# Patient Record
Sex: MALE | Race: WHITE | NOT HISPANIC OR LATINO | Employment: OTHER | URBAN - METROPOLITAN AREA
[De-identification: names, ages, dates, MRNs, and addresses within clinical notes are randomized per-mention and may not be internally consistent; named-entity substitution may affect disease eponyms.]

---

## 2017-05-16 ENCOUNTER — OFFICE VISIT (OUTPATIENT)
Dept: FAMILY MEDICINE | Facility: CLINIC | Age: 42
End: 2017-05-16
Payer: COMMERCIAL

## 2017-05-16 ENCOUNTER — DOCUMENTATION ONLY (OUTPATIENT)
Dept: FAMILY MEDICINE | Facility: CLINIC | Age: 42
End: 2017-05-16

## 2017-05-16 VITALS
OXYGEN SATURATION: 99 % | HEART RATE: 82 BPM | RESPIRATION RATE: 16 BRPM | SYSTOLIC BLOOD PRESSURE: 119 MMHG | DIASTOLIC BLOOD PRESSURE: 84 MMHG | BODY MASS INDEX: 20.04 KG/M2 | TEMPERATURE: 98 F | WEIGHT: 147.94 LBS | HEIGHT: 72 IN

## 2017-05-16 DIAGNOSIS — R07.89 OTHER CHEST PAIN: Primary | ICD-10-CM

## 2017-05-16 DIAGNOSIS — G43.909 MIGRAINE WITHOUT STATUS MIGRAINOSUS, NOT INTRACTABLE, UNSPECIFIED MIGRAINE TYPE: ICD-10-CM

## 2017-05-16 PROCEDURE — 99214 OFFICE O/P EST MOD 30 MIN: CPT | Mod: S$GLB,,, | Performed by: INTERNAL MEDICINE

## 2017-05-16 PROCEDURE — 1160F RVW MEDS BY RX/DR IN RCRD: CPT | Mod: S$GLB,,, | Performed by: INTERNAL MEDICINE

## 2017-05-16 RX ORDER — IBUPROFEN 200 MG
TABLET ORAL
Refills: 0 | COMMUNITY
Start: 2017-04-27 | End: 2017-08-30

## 2017-05-16 RX ORDER — PROPRANOLOL HYDROCHLORIDE 20 MG/1
20 TABLET ORAL 3 TIMES DAILY
Qty: 90 TABLET | Refills: 11 | Status: SHIPPED | OUTPATIENT
Start: 2017-05-16 | End: 2017-06-28

## 2017-05-16 RX ORDER — SUMATRIPTAN SUCCINATE 100 MG/1
100 TABLET ORAL ONCE
Qty: 6 TABLET | Refills: 11 | Status: SHIPPED | OUTPATIENT
Start: 2017-05-16 | End: 2019-07-11

## 2017-05-16 RX ORDER — OMEPRAZOLE 20 MG/1
20 CAPSULE, DELAYED RELEASE ORAL DAILY
Qty: 30 CAPSULE | Refills: 11 | Status: SHIPPED | OUTPATIENT
Start: 2017-05-16 | End: 2018-06-13 | Stop reason: SDUPTHER

## 2017-05-16 RX ORDER — FAMOTIDINE 20 MG/1
1 TABLET, FILM COATED ORAL DAILY PRN
Refills: 0 | COMMUNITY
Start: 2017-04-27 | End: 2017-09-25

## 2017-05-16 NOTE — PROGRESS NOTES
Subjective:       Patient ID: Jabier Doss is a 41 y.o. male.    Chief Complaint: Hospital Follow Up (chest pain) and Sinus Problem    HPI       CHIEF COMPLAINT: CHEST PAIN    HPI:     ONSET/TIMING:   3 wks  ago    DURATION:  4 hours  QUALITY/COURSE:  Resolved.  Went to the ER.    SEVERITY: #   8 /10 ( on 1 to 10 scale)    PREVIOUS CARDIAC TESTING: : exercise stress test [facility]. Was neg     LOCATION:  substernal   Radiation -  none    All choices for next 4 sections below are positive to the patient ONLY if BOLDED, otherwise negative:    AGGRAVATING FACTORS: Swallowing, , Deep_Breathing, Coughing, Neck/Arm/Chest_Movement     RELIEVING FACTORS: Nitroglycerin, Resting, Change_of_Position    ASSOCIATED SYMPTOMS:  Hemoptysis,Tenderness,  Leg_Pain    RISK FACTORS: Diabetes, HTN, Hyperlipidemia, smoking,       CHIEF COMPLAINT: Headache  HPI: Sometimes gets tearing of the left eye  presently has headache: no..    Severity is # 8  (10 point scale).  has a headache    20       days per month.  ONSET:      5 y  ago.    QUALITY/COURSE:    unchanged  DURATION: One half hour to 24 hours   The following symptoms/statements  are positive if BOLD, negative otherwise.     CHARACTERISTICS: sudden. atypical.   Awakening_the_patient_from_sleep. Worse_on_awakening.. .  Pulsating .     Squeezing.  Stabbing.  LOCATION:  . Right.  Left:. . Forehead. Face. Jaw. Eyes. Frontal. Temporal. Top/Vertex. Posterior. Radiation:   AGGRAVATING FACTORS: head trauma . FH of headache.   anxiety .  pressure points of the face . placing the head lower than the trunk .  PAST TREATMENT OR EVALUATION: medications:   CT scan .  MRI.  ASSOCIATED SYMPTOMS:  N/V .  Changes_in_memory .  Change_in_mentation .  Motor_changes . Sensation_changes.  . Stiff_neck . fever. phonophobia .  . prodrome.  Medical History: Any_neurological_disease . migraines . Emotional_problems . Sinus_disease .                      Review of Systems   Constitutional: Negative for  activity change and unexpected weight change.   HENT: Negative for hearing loss, rhinorrhea and trouble swallowing.    Eyes: Positive for photophobia. Negative for discharge and visual disturbance.   Respiratory: Positive for chest tightness. Negative for wheezing.    Cardiovascular: Positive for chest pain and palpitations.   Gastrointestinal: Negative for blood in stool, constipation, diarrhea and vomiting.   Endocrine: Negative for polydipsia and polyuria.   Genitourinary: Negative for difficulty urinating, hematuria and urgency.   Musculoskeletal: Negative for arthralgias and joint swelling.   Neurological: Positive for headaches. Negative for weakness.   Psychiatric/Behavioral: Negative for confusion and dysphoric mood.       Objective:      Vitals:    05/16/17 1606   BP: 119/84   Pulse: 82   Resp: 16   Temp: 98.2 °F (36.8 °C)   TempSrc: Oral   SpO2: 99%   Weight: 67.1 kg (147 lb 14.9 oz)   Height: 6' (1.829 m)   PainSc: 0-No pain     Physical Exam   Constitutional: He appears well-developed and well-nourished.   Eyes: Pupils are equal, round, and reactive to light.   Cardiovascular: Normal rate, regular rhythm and normal heart sounds.    Pulmonary/Chest: Effort normal and breath sounds normal.   Abdominal: Soft. There is no tenderness.   Neurological: He is alert.   Psychiatric: He has a normal mood and affect. His behavior is normal. Thought content normal.   Nursing note and vitals reviewed.        Assessment:       1. Other chest pain    2. Migraine without status migrainosus, not intractable, unspecified migraine type        some attributes of cluster headache probably migraine  Plan:     Other chest pain  -     omeprazole (PRILOSEC) 20 MG capsule; Take 1 capsule (20 mg total) by mouth once daily.  Dispense: 30 capsule; Refill: 11    Migraine without status migrainosus, not intractable, unspecified migraine type  -     propranolol (INDERAL) 20 MG tablet; Take 1 tablet (20 mg total) by mouth 3 (three) times  daily.  Dispense: 90 tablet; Refill: 11  -     sumatriptan (IMITREX) 100 MG tablet; Take 1 tablet (100 mg total) by mouth once. No more than twice in a 24 hour period  Dispense: 6 tablet; Refill: 11      Return in about 6 weeks (around 6/27/2017).

## 2017-05-16 NOTE — PATIENT INSTRUCTIONS
When you get the sinus congestion take 2 Kim-[place]'s or the Imitrex.  Don't let it wait until you get the headache.    If not drinking the caffeine provokes the headache he may be getting caffeine withdrawal headaches.  Let us know and we can review some medications to help you stay off of it for 2 weeks after which she'll have fewer headaches.

## 2017-05-16 NOTE — PROGRESS NOTES
Health Maintenance Due   Topic Date Due    TETANUS VACCINE  11/03/1992    Pneumococcal PPSV23 (Medium Risk) (1) 11/03/1992

## 2017-05-16 NOTE — MR AVS SNAPSHOT
[facility]  [address] [address] [address]  Phone: [phone]  Fax: [phone]                  Jabier Doss   2017 4:00 PM   Office Visit    Description:  Male : 1975   Provider:  Jefferson Franco MD   Department:  [facility]           Reason for Visit     Hospital Follow Up     Sinus Problem           Diagnoses this Visit        Comments    Other chest pain    -  Primary     Migraine without status migrainosus, not intractable, unspecified migraine type                To Do List           Future Appointments        Provider Department Dept Phone    2017 4:20 PM Jefferson Franco MD [facility] [phone]      Goals (5 Years of Data)     None      Follow-Up and Disposition     Return in about 6 weeks (around 2017).    Follow-up and Disposition History       These Medications        Disp Refills Start End    omeprazole (PRILOSEC) 20 MG capsule 30 capsule 11 2017     Take 1 capsule (20 mg total) by mouth once daily. - Oral    Pharmacy: [address] - [address] [facility] [address] Ph #: [phone]       propranolol (INDERAL) 20 MG tablet 90 tablet 11 2017    Take 1 tablet (20 mg total) by mouth 3 (three) times daily. - Oral    Pharmacy: [address] - [address] [address] Ph #: [phone]       sumatriptan (IMITREX) 100 MG tablet 6 tablet 11 2017    Take 1 tablet (100 mg total) by mouth once. No more than twice in a 24 hour period - Oral    Pharmacy: [address] - [address] [address] Ph #: [phone]         [facility] On Call     [facility] -  Assistance  Unless otherwise directed by your provider, please contact [facility], our nurse care line that is available for  assistance.     Registered nurses in the [facility] provide: appointment scheduling, clinical advisement, health  education, and other advisory services.  Call: [phone] (toll free)               Medications           Message regarding Medications     Verify the changes and/or additions to your medication regime listed below are the same as discussed with your clinician today.  If any of these changes or additions are incorrect, please notify your healthcare provider.        START taking these NEW medications        Refills    omeprazole (PRILOSEC) 20 MG capsule 11    Sig: Take 1 capsule (20 mg total) by mouth once daily.    Class: Normal    Route: Oral    propranolol (INDERAL) 20 MG tablet 11    Sig: Take 1 tablet (20 mg total) by mouth 3 (three) times daily.    Class: Normal    Route: Oral    sumatriptan (IMITREX) 100 MG tablet 11    Sig: Take 1 tablet (100 mg total) by mouth once. No more than twice in a 24 hour period    Class: Normal    Route: Oral           Verify that the below list of medications is an accurate representation of the medications you are currently taking.  If none reported, the list may be blank. If incorrect, please contact your healthcare provider. Carry this list with you in case of emergency.           Current Medications     cetirizine (ZYRTEC) 10 MG tablet Take 1 tablet (10 mg total) by mouth once daily.    famotidine (PEPCID) 20 MG tablet Take 1 tablet by mouth daily as needed.    nicotine (NICODERM CQ) 21 mg/24 hr     tamsulosin (FLOMAX) 0.4 mg Cp24 Take 1 capsule (0.4 mg total) by mouth once daily.    omeprazole (PRILOSEC) 20 MG capsule Take 1 capsule (20 mg total) by mouth once daily.    propranolol (INDERAL) 20 MG tablet Take 1 tablet (20 mg total) by mouth 3 (three) times daily.    sumatriptan (IMITREX) 100 MG tablet Take 1 tablet (100 mg total) by mouth once. No more than twice in a 24 hour period           Clinical Reference Information           Your Vitals Were     BP Pulse Temp Resp Height Weight    119/84 (BP Location: Left arm, Patient Position: Sitting, BP Method: Automatic) 82  98.2 °F (36.8 °C) (Oral) 16 6' (1.829 m) 67.1 kg (147 lb 14.9 oz)    SpO2 BMI             99% 20.06 kg/m2         Blood Pressure          Most Recent Value    BP  119/84      Allergies as of 5/16/2017     No Known Allergies      Immunizations Administered on Date of Encounter - 5/16/2017     None      Instructions    When you get the sinus congestion take 2 Kim-[place]'s or the Imitrex.  Don't let it wait until you get the headache.    If not drinking the caffeine provokes the headache he may be getting caffeine withdrawal headaches.  Let us know and we can review some medications to help you stay off of it for 2 weeks after which she'll have fewer headaches.       Language Assistance Services     ATTENTION: Language assistance services are available, free of charge. Please call [phone].      ATENCIÓN: Si jennifer hargrove, tiene a ordonez disposición servicios gratuitos de asistencia lingüística. Llame al [phone].     CHÚ Ý: N?u b?n nói Ti?ng Vi?t, có các d?ch v? h? tr? ngôn ng? mi?n phí dành cho b?n. G?i s? [phone].         [facility] complies with applicable Federal civil rights laws and does not discriminate on the basis of race, color, national origin, age, disability, or sex.

## 2017-06-28 ENCOUNTER — OFFICE VISIT (OUTPATIENT)
Dept: FAMILY MEDICINE | Facility: CLINIC | Age: 42
End: 2017-06-28
Payer: COMMERCIAL

## 2017-06-28 ENCOUNTER — DOCUMENTATION ONLY (OUTPATIENT)
Dept: FAMILY MEDICINE | Facility: CLINIC | Age: 42
End: 2017-06-28

## 2017-06-28 VITALS
TEMPERATURE: 98 F | DIASTOLIC BLOOD PRESSURE: 82 MMHG | HEIGHT: 72 IN | WEIGHT: 147.5 LBS | RESPIRATION RATE: 16 BRPM | HEART RATE: 63 BPM | SYSTOLIC BLOOD PRESSURE: 126 MMHG | OXYGEN SATURATION: 97 % | BODY MASS INDEX: 19.98 KG/M2

## 2017-06-28 DIAGNOSIS — R07.89 ATYPICAL CHEST PAIN: Primary | ICD-10-CM

## 2017-06-28 DIAGNOSIS — G43.009 MIGRAINE WITHOUT AURA AND WITHOUT STATUS MIGRAINOSUS, NOT INTRACTABLE: ICD-10-CM

## 2017-06-28 PROCEDURE — 99214 OFFICE O/P EST MOD 30 MIN: CPT | Mod: S$GLB,,, | Performed by: INTERNAL MEDICINE

## 2017-06-28 RX ORDER — NITROGLYCERIN 0.4 MG/1
0.4 TABLET SUBLINGUAL EVERY 5 MIN PRN
Qty: 20 TABLET | Refills: 1 | Status: SHIPPED | OUTPATIENT
Start: 2017-06-28 | End: 2019-07-11

## 2017-06-28 RX ORDER — METOCLOPRAMIDE 5 MG/1
TABLET ORAL
Qty: 20 TABLET | Refills: 1 | Status: SHIPPED | OUTPATIENT
Start: 2017-06-28 | End: 2017-09-25

## 2017-06-28 RX ORDER — NAPROXEN SODIUM 220 MG/1
81 TABLET, FILM COATED ORAL DAILY
Start: 2017-06-28 | End: 2019-07-11

## 2017-06-28 NOTE — PATIENT INSTRUCTIONS
Stop propanolol    If you get a chest pain last more than 5 minutes taken nitroglycerin and some Mylanta.  If it lasts more than 20 minutes call 911    Prilosec needs to be taken half hour before you eat food.

## 2017-06-28 NOTE — PROGRESS NOTES
Subjective:       Patient ID: Jabier Doss is a 41 y.o. male.    Chief Complaint: Headache (discuss medication )    HPI   CHIEF COMPLAINT: Headache  HPI: Taking Kim-[place]', s working well.  He cut out the energy drinks is not getting some any migraines.  He hasn't had to take any Imitrex.  The patient is not tolerating the propranolol.  Makes him irritable and constipated  presently has headache: no..    Severity is # 3   (10 point scale).  has a headache         4  days per month.  ONSET:   Years     ago.    QUALITY/COURSE:    Markedly improved  DURATION:     The following symptoms/statements  are positive if BOLD, negative otherwise.     CHARACTERISTICS: sudden. atypical.   Awakening_the_patient_from_sleep. Worse_on_awakening.. .  Pulsating .     Squeezing.  Stabbing.  LOCATION:  . Right.  Left:. . Forehead. Face. Jaw. Eyes. Frontal. Temporal. Top/Vertex. Posterior. Radiation:   AGGRAVATING FACTORS: head trauma . FH of headache.   anxiety .  pressure points of the face . placing the head lower than the trunk .  PAST TREATMENT OR EVALUATION: medications:   CT scan .  MRI.  ASSOCIATED SYMPTOMS:  N/V .  Changes_in_memory .  Change_in_mentation .  Motor_changes . Sensation_changes.  . Stiff_neck . fever. phonophobia .  . prodrome.  Medical History: Any_neurological_disease . migraines . Emotional_problems . Sinus_disease .            CHIEF COMPLAINT: CHEST PAIN    HPI: Usually happens when he is at rest never when he's working.  The patient states that he's taking the Prilosec on an empty stomach in the morning and never eating breakfast.    ONSET/TIMIN months   ago    DURATION:  Anywhere from seconds to a half hour.  QUALITY/COURSE:  unchanged.    SEVERITY: #    6  /10 ( on 1 to 10 scale)    PREVIOUS CARDIAC TESTING: : exercise stress test Stress done on 17 at [facility] and was negative.    LOCATION:  Left chest near where he had a pneumothorax and a chest tube   Radiation -  none    All choices for next  4 sections below are positive to the patient ONLY if BOLDED, otherwise negative:    AGGRAVATING FACTORS: Swallowing, , Deep_Breathing, Coughing, Neck/Arm/Chest_Movement     RELIEVING FACTORS: Nitroglycerin, Resting, Change_of_Position    ASSOCIATED SYMPTOMS:  Hemoptysis,Tenderness,  Leg_Pain    RISK FACTORS: Diabetes, HTN, Hyperlipidemia, smoking,                     Review of Systems   Constitutional: Negative for activity change and unexpected weight change.   HENT: Negative for hearing loss, rhinorrhea and trouble swallowing.    Eyes: Negative for discharge and visual disturbance.   Respiratory: Positive for chest tightness. Negative for wheezing.    Cardiovascular: Positive for chest pain. Negative for palpitations.   Gastrointestinal: Positive for constipation. Negative for blood in stool, diarrhea and vomiting.   Endocrine: Negative for polydipsia and polyuria.   Genitourinary: Negative for difficulty urinating, hematuria and urgency.   Musculoskeletal: Negative for arthralgias, joint swelling and neck pain.   Neurological: Positive for weakness and headaches.   Psychiatric/Behavioral: Positive for confusion. Negative for dysphoric mood.       Objective:      Vitals:    06/28/17 1633   BP: 126/82   Pulse: 63   Resp: 16   Temp: 98 °F (36.7 °C)   TempSrc: Oral   SpO2: 97%   Weight: 66.9 kg (147 lb 7.8 oz)   Height: 6' (1.829 m)   PainSc:   2   PainLoc: Abdomen     Physical Exam   Constitutional: He appears well-developed and well-nourished.   Eyes: Pupils are equal, round, and reactive to light.   Cardiovascular: Normal rate, regular rhythm and normal heart sounds.    Pulmonary/Chest: Effort normal and breath sounds normal. He exhibits no tenderness.   Abdominal: Soft. There is no tenderness.   Neurological: He is alert.   Psychiatric: He has a normal mood and affect. His behavior is normal. Thought content normal.   Nursing note and vitals reviewed.        Assessment:       1. Atypical chest pain    2. Migraine  without aura and without status migrainosus, not intractable          Plan:     Atypical chest pain  -     nitroGLYCERIN (NITROSTAT) 0.4 MG SL tablet; Place 1 tablet (0.4 mg total) under the tongue every 5 (five) minutes as needed for Chest pain.  Dispense: 20 tablet; Refill: 1  -     aspirin 81 MG Chew; Take 1 tablet (81 mg total) by mouth once daily.    Migraine without aura and without status migrainosus, not intractable  -     metoclopramide HCl (REGLAN) 5 MG tablet; Take with 2 Kim-[place]'s at the start of a headache  Dispense: 20 tablet; Refill: 1      Return in about 3 months (around 9/28/2017).

## 2017-07-05 DIAGNOSIS — R35.1 NOCTURIA: ICD-10-CM

## 2017-07-05 RX ORDER — TAMSULOSIN HYDROCHLORIDE 0.4 MG/1
CAPSULE ORAL
Qty: 30 CAPSULE | Refills: 11 | Status: SHIPPED | OUTPATIENT
Start: 2017-07-05 | End: 2017-09-25

## 2017-08-30 ENCOUNTER — OFFICE VISIT (OUTPATIENT)
Dept: FAMILY MEDICINE | Facility: CLINIC | Age: 42
End: 2017-08-30
Payer: COMMERCIAL

## 2017-08-30 ENCOUNTER — DOCUMENTATION ONLY (OUTPATIENT)
Dept: FAMILY MEDICINE | Facility: CLINIC | Age: 42
End: 2017-08-30

## 2017-08-30 VITALS
OXYGEN SATURATION: 100 % | HEART RATE: 85 BPM | HEIGHT: 72 IN | TEMPERATURE: 99 F | DIASTOLIC BLOOD PRESSURE: 91 MMHG | WEIGHT: 146.38 LBS | SYSTOLIC BLOOD PRESSURE: 121 MMHG | BODY MASS INDEX: 19.83 KG/M2

## 2017-08-30 DIAGNOSIS — L24.9 IRRITANT CONTACT DERMATITIS, UNSPECIFIED TRIGGER: ICD-10-CM

## 2017-08-30 DIAGNOSIS — B86 SCABIES: Primary | ICD-10-CM

## 2017-08-30 PROBLEM — G43.009 MIGRAINE WITHOUT AURA AND WITHOUT STATUS MIGRAINOSUS, NOT INTRACTABLE: Status: ACTIVE | Noted: 2017-08-30

## 2017-08-30 PROCEDURE — 99213 OFFICE O/P EST LOW 20 MIN: CPT | Mod: S$GLB,,, | Performed by: INTERNAL MEDICINE

## 2017-08-30 PROCEDURE — 3008F BODY MASS INDEX DOCD: CPT | Mod: S$GLB,,, | Performed by: INTERNAL MEDICINE

## 2017-08-30 RX ORDER — PERMETHRIN 50 MG/G
CREAM TOPICAL ONCE
Qty: 120 G | Refills: 1 | Status: SHIPPED | OUTPATIENT
Start: 2017-08-30 | End: 2017-08-30

## 2017-08-30 RX ORDER — FLUOCINONIDE 0.5 MG/G
CREAM TOPICAL 2 TIMES DAILY
Qty: 120 G | Refills: 3 | Status: SHIPPED | OUTPATIENT
Start: 2017-08-30 | End: 2019-07-11

## 2017-08-30 NOTE — PATIENT INSTRUCTIONS
Scabies     To prevent spread of infection, wash clothing, linens, and toys in very hot water.     Scabies is an infection caused by very tiny mites that burrow into the skin. The mites are called Sarcoptes scabiei. They cause severe itching. Though children are most commonly infected, anyone can get scabies. Scabies mites can pass from person to person through close physical contact. They can also be passed through shared clothing, towels, and bedding. Scabies infection is not usually dangerous, but it is uncomfortable. Because it is so contagious, scabies should be treated immediately to keep the infection from spreading.  Symptoms  Symptoms of scabies appear about 2 to 6 weeks after infection in a child or adult who has never had scabies before. A child or adult who has been infected before will experience symptoms much sooner, in 1 to 4 days. Signs of scabies infection may include:  · Intense itching, especially at night or after a hot bath  · Skin irritations that look like hives, insect bites, pimples, or blisters, especially on warmer areas of the body (such as between the fingers, in the armpits, and in the creases of the wrists, elbows, and knees)  · Sores on the body caused by scratching (the sores may become infected)  · [place] created by mites traveling under the skin, which look like lines on the skins surface  Treating scabies infection  Scabies infections are usually treated with a prescription lotion that kills the mites. The lotion must be applied to the entire body from the neck down. This includes the palms of the hands, soles of the feet, groin, and under the fingernails. The lotion must be left on for 8 to 14 hours. In some cases, a second application of lotion is needed a week after the first. Medicines work quickly, but most children and adults continue to have an itchy rash for several weeks after treatment. Marks on the skin from scabies usually go away in 1 to 2 weeks, but sometimes  take a few months to clear.  Preventing spread of the infection  To prevent reinfection and the spread of scabies to others, follow these instructions:  · Wash the infected persons clothing, towels, bed linens, cloth toys, and other personal items in very hot, soapy water. Dry them thoroughly. Do not share among family members.   · Seal items that cant be washed in plastic bags for 2 weeks.  · Vacuum floors and furniture. Throw the vacuum bag away afterward.  · Notify an infected childs school and caregivers so that other children can be checked and treated.  · Keep an infected child home from  or school until the morning after treatment for scabies.  · Warn children not to share items such as clothing and towels with other children.  · You may need to treat all household members who may have been exposed to scabies, whether they show symptoms or not. Talk with your healthcare provider.  · Do not spray your house with chemicals or pesticides. These can be dangerous to your familys health.  When to call the healthcare provider if:  · The infected person has a fever, red streaks, pain, or swelling of the skin.  · Sores get worse or do not heal.  · New rashes appear or itching continues for more than 2 weeks after treatment.   Date Last Reviewed: 6/1/2016 © 2000-2016 [employer]. [address] [place] [address], [place] [address]. All rights reserved. This information is not intended as a substitute for professional medical care. Always follow your healthcare professional's instructions.      If not better continues the Elimite again in one week for the whole family.  If the whole family is not treated you will get it back.

## 2017-08-30 NOTE — PROGRESS NOTES
Subjective:       Patient ID: Jabier Doss is a 41 y.o. male.    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 .    ONSET/TIMING: Onset   2 months        ago. Sudden: no.. Work related: no. Similar_problems_in_the_past: no.    DURATION:  Continuous..    QUALITY/COURSE:   unchanged  .     LOCATION:   Forearms and ankles.  Hands also involved  .     INTENSITY/SEVERITY:  Severity is #   5    (10 point scale).    CONTEXT/WHEN: .--Similar problems: no . .  Past treatments: none  . Exposure_to_others_with_similar_symptoms: no . . Exposure_to_poison _ivy: no. .   New exposures (soaps, lotions, laundry detergents, foods, medications, plants, insects or animals).    SYMPTOMS/RELATED: .--Possible medication side effect:    The following symptoms are positive if BOLD, negative otherwise.     REVIEW OF SYMPTOMS:  Itching.  Pain. Sharp_pain. Dull_pain. Burning_pain.  Erythema-Skin. Hypopigmentation.  hyperpigmentation . Inflammation. [place]_Patch.. fixed . evanescent.  Blisters. Purulence. Fever. Fatigue. Tick_Bites.               Review of Systems   Constitutional: Negative for fatigue and fever.   HENT: Negative for congestion, rhinorrhea and sore throat.    Eyes: Negative for pain.   Respiratory: Negative for cough and shortness of breath.    Gastrointestinal: Negative for diarrhea and vomiting.   Skin: Positive for rash.       Objective:      Vitals:    08/30/17 1420   BP: (!) 121/91   Pulse: 85   Temp: 98.8 °F (37.1 °C)   TempSrc: Oral   SpO2: 100%   Weight: 66.4 kg (146 lb 6.2 oz)   Height: 6' (1.829 m)   PainSc: 0-No pain     Physical Exam   Constitutional: He appears well-developed and well-nourished.   Eyes: Pupils are equal, round, and reactive to light.   Cardiovascular: Normal rate, regular rhythm and normal heart sounds.    Pulmonary/Chest: Effort normal and breath sounds normal.   Abdominal:  Soft. There is no tenderness.   Neurological: He is alert.   Skin: Rash (The rash on the forearms bilaterally and especially in the finger webs.  Some involvement on the ankles as well.) noted.   Psychiatric: He has a normal mood and affect. His behavior is normal. Thought content normal.   Nursing note and vitals reviewed.        Assessment:       1. Scabies    2. Irritant contact dermatitis, unspecified trigger          Plan:     Scabies  -     permethrin (ELIMITE) 5 % cream; Apply topically once. Apply head to toe and leave on for 12 hours.  Everybody in the family should do it.  Half bottle covers 1 person  Dispense: 120 g; Refill: 1    Irritant contact dermatitis, unspecified trigger  -     fluocinonide 0.05% (LIDEX) 0.05 % cream; Apply topically 2 (two) times daily.  Dispense: 120 g; Refill: 3      Return if symptoms worsen or fail to improve, for if you are not better return in 2 weeks.

## 2017-08-30 NOTE — PROGRESS NOTES
Health Maintenance Due   Topic Date Due    TETANUS VACCINE  11/03/1992    Influenza Vaccine  08/01/2017

## 2017-08-31 ENCOUNTER — TELEPHONE (OUTPATIENT)
Dept: FAMILY MEDICINE | Facility: CLINIC | Age: 42
End: 2017-08-31

## 2017-08-31 ENCOUNTER — PATIENT MESSAGE (OUTPATIENT)
Dept: FAMILY MEDICINE | Facility: CLINIC | Age: 42
End: 2017-08-31

## 2017-08-31 ENCOUNTER — OFFICE VISIT (OUTPATIENT)
Dept: UROLOGY | Facility: CLINIC | Age: 42
End: 2017-08-31
Payer: COMMERCIAL

## 2017-08-31 VITALS
SYSTOLIC BLOOD PRESSURE: 142 MMHG | HEART RATE: 76 BPM | WEIGHT: 147.5 LBS | DIASTOLIC BLOOD PRESSURE: 92 MMHG | HEIGHT: 72 IN | RESPIRATION RATE: 18 BRPM | BODY MASS INDEX: 19.98 KG/M2 | TEMPERATURE: 98 F

## 2017-08-31 DIAGNOSIS — N40.0 BENIGN PROSTATIC HYPERPLASIA, PRESENCE OF LOWER URINARY TRACT SYMPTOMS UNSPECIFIED: ICD-10-CM

## 2017-08-31 DIAGNOSIS — N48.6 PEYRONIE'S DISEASE: Primary | ICD-10-CM

## 2017-08-31 DIAGNOSIS — J93.83 SPONTANEOUS PNEUMOTHORAX: ICD-10-CM

## 2017-08-31 PROCEDURE — 99999 PR PBB SHADOW E&M-EST. PATIENT-LVL III: CPT | Mod: PBBFAC,,, | Performed by: UROLOGY

## 2017-08-31 PROCEDURE — 3008F BODY MASS INDEX DOCD: CPT | Mod: S$GLB,,, | Performed by: UROLOGY

## 2017-08-31 PROCEDURE — 99204 OFFICE O/P NEW MOD 45 MIN: CPT | Mod: S$GLB,,, | Performed by: UROLOGY

## 2017-08-31 NOTE — PROGRESS NOTES
[facility] Note - [place]  Staff: MD Flo    Referring provider and please cc: none  PCP: [facility]    Chief Complaint: peyronies     Subjective:        HPI: Jabier Doss is a 41 y.o. male presents with     peyronie's  He says a few years ago he has what sounds like a penile fracture 4-5 years ago. His partner was on top, they heard pop, had immediate detumescence. It was very painful but didn't bruise. He says he has had significant shortening of penis and bends almost 90 degrees dorsal. He says he is able to get an erection hard enough for penetration, has no problems for this. Not painful to have intercourse. No longer c/o any pain in penis. He says over the years the curvature had worsened but has now been stable for the past year.      He has seen only a pcp for this who gave him ED meds. He  Has not seen a urologist for this.     bph  He says he sometimes has incomplete empyting. Has been on flomax for the past 1 year. He says he has a good stream. Denies hesistancy. No intermittency. occ pos void dribbling.  Was having nocturia and no longer having this.     AUA SSx: 6, unhappy   IIEF: 23    ECOG Status: 0     Gross Hematuria:No  STDs in past: No  Vasectomy: none    REVIEW OF SYSTEMS:  General ROS: no fevers, no chills  Psychological ROS: no depression  Endocrine ROS: no heat or cold  Respiratory ROS: + SOB  Cardiovascular ROS: + CP has been evaluated for this.   Gastrointestinal ROS: no abdominal pain, no constipation, no diarrhea, no BRBPR, + ulcers - on prilosec  Musculoskeletal ROS: no muscle pain  Neurological ROS: occ headaches  Dermatological ROS: no rashes  HEENT: + glasses, + sinus   ROS: per HPI  Recently diagnosed with scabies     PMHx:  Past Medical History:   Diagnosis Date    Depression    BPh  Gastric ulcers  Allergies'  Kidney stones: No  Cataracts? none    PSHx:  Spontaneous penumothorax - mom and uncle had it.      Stents/Valves/Foreign Bodies: No  Cardiac Evaluation: No    Screening Studies  Colonoscopy: none    Fam Hx:   malignancies: No  . Father alive a 63. Mother alive a63  kidney stones: No     Soc Hx:  Former tobacco.  2pk per day x  4-5 year  Rare alcohol  Lives in [place]  :yes  Children: 2  Occupation:air conditioning    Allergies:  Review of patient's allergies indicates no known allergies.    Medications: reviewed   Anticoagulation: yes, asa 81mg    Objective:     Vitals:    08/31/17 0953   BP: (!) 142/92   Pulse: 76   Resp: 18   Temp: 97.6 °F (36.4 °C)         General:WDWN in NAD  Eyes: PERRLA, normal conjunctiva  Respiratory: no increased work on breathing, clear to auscultation  Cardiovascular: regular rate and rhythm. No obvious extremity edema.  GI: palpation of masses. No tenderness. No hepatosplenomegaly to palpation.  Musculoskeletal: normal range of motion of bilateral upper extremities. Normal muscle strength and tone.  Skin: no obvious rashes or lesions. No tightening of skin noted.  Neurologic: CN grossly normal. Normal sensation.   Psychiatric: awake, alert and oriented x 3. Mood and affect normal. Cooperative.    :  Inspection of anus normal  No scrotal rashes, cysts or lesions  Epididymis normal in size, no tenderness  Testes normal and size, equal size bilaterally, no masses  Urethral meatus normal without discharge  Penis is circumcised, 5 skin tags? Present since 11 years old, unchanged in size   SHELLY: 40g gland without masses, tenderness. SV not palpable. Normal sphincter tone. No hemhorroids.  No bilateral inguinal hernias noted     LABS REVIEW:  UA today: 1.020/5/remainder neg  UCx:  none    Cr:   Lab Results   Component Value Date    CREATININE 0.9 03/15/2016       PSA:   none  No results found for: PSA  Testosterone: No results found for: TESTOSTERONE    PATHOLOGY REVIEW:  none    RADIOGRAPHIC REVIEW:  none      Assessment:       1. Peyronie's disease    2. Spontaneous  pneumothorax    3. Benign prostatic hyperplasia, presence of lower urinary tract symptoms unspecified          Plan:     Peyronie's dz, stable phase  -pt is going to take a picture of his erect penis to bring to appt  -discussed possible treatment options including conservative treatment with xiaflex injections   -more invasive treament would be plication vs plaque excision and straightenining or penile prosthesis however pt not having erection issues now, biggest complaint and girth which we cannot fix without penile prosthesis which may not be indicated in a pt without actual erectile dysfunction.  -pt would at least want to have it straightened.  -have contacted  for further recommendations and possible follow up with him.    bph (enlarged prostate)  -hes seen some improvement with nocturia (waking up at night)  -he wants try a trial off it and will see if there is a difference and will restart if there is.    H/o spontaneous pneumothorax, c/o chest pain, tall and thin pt  -sent message to  to see if pt would need workup for possible marfan's???    F/u 3 months to discuss bhd ensure he's had f/u for his peyronie's.     Karishma Rossi MD

## 2017-08-31 NOTE — PATIENT INSTRUCTIONS
Peyronie's dz, stable phase  -pt is going to take a picture of his erect penis to bring to appt  -discussed possible treatment options including conservative treatment with xiaflex injections   -more invasive treament would be plication vs plaque excision and straightenining or penile prosthesis however pt not having erection issues now, biggest complaint and girth which we cannot fix without penile prosthesis which may not be indicated in a pt without actual erectile dysfunction.  -pt would at least want to have it straightened.  -have contacted  for further recommendations and possible follow up with him.    bph (enlarged prostate)  -hes seen some improvement with nocturia (waking up at night)  -he wants try a trial off it and will see if there is a difference and will restart if there is.    H/o spontaneous pneumothorax, c/o chest pain, tall and thin pt  -sent message to  to see if pt would need workup for possible marfan's???    F/u 3 months to discuss bhd ensure he's had f/u for his peyronie's.       What Is Peyronie Disease?  A slight natural curve to the erect penis is usually normal. But curvature that causes pain and difficulty with intercourse is a problem. The development of painful curvature is called Peyronie disease. Peyronie disease is due to a plaque (scar) that forms inside the penis.      Your Penile Anatomy  The shaft (body) of the penis consists of the corpora cavernosa, 2 columns of spongy tissue. During an erection, this tissue fills with blood, swells, and becomes rigid, creating an erection. A dense sheath of elastic tissue called the tunica surrounds the corpora. This tissue stretches as the penis becomes erect.  Painful Curvature  Peyronie disease occurs when a plaque (scar) develops on the fibrous sheath of tissue surrounding the corpora. The scar can form on any part of the penis, but often is found on the top or bottom. The scarred area of the tunica loses its  elasticity, so it doesnt stretch when the corpora swells. Because the tunica doesnt stretch in that area, the erect penis curves in the direction of the scar.  Possible Causes  No one is sure just what causes the plaque. It may be the result of an injury to the erect penis or a blow to the groin. The plaque may occur because of a problem with your immune system. One thing is certain, however, that Peyronie disease is not caused by sexually transmitted diseases and is not cancer.   Symptoms of Peyronie Disease  · Curvature of the penis during erection (which may interfere with intercourse)  · Pain during erection  · Soft erections  · Shortening or narrowing of the penis  A hard area usually felt below the skin of the penis in the area of the plaque.  Date Last Reviewed: 9/18/2014 © 2000-2016 [employer]. [address] [place] [address]. All rights reserved. This information is not intended as a substitute for professional medical care. Always follow your healthcare professional's instructions.      Treating Peyronie Disease  Peyronie disease occurs when the penis curves during an erection. This is most often due to a plaque (scar) that forms inside the penis. In some men, the plaque shrinks and disappears on its own, without treatment. If treatment is needed, the main goal is to relieve pain and make the penis straight enough for sex. There are different kinds of treatment. The success of these different treatments vary from man to man.  Medication  Medication is usually tried for 1 year to 2 years before other treatments are done. For some men, the disease will go away during this time. Medication may help reduce pain. It may also help soften and shrink the plaque in the penis. Some medications may be taken by mouth. And some may be rubbed right on the penis. Others may be injected into the plaque. Medications that treat erectile dysfunction may help with some of the problems of Peyronie  disease. But they will not treat the curvature or pain. Your doctor will discuss all your options and possible side effects with you.  Surgery  Surgery is used in cases that cant be treated by other means. It may also be done for a severe curve in the penis. It may also be done for severe pain that does not stop. Options for surgery include:  · An incision in the plaque to release tension. Part of the plaque is removed and replaced with a graft.  · Making the penis shorter. This is done on the opposite side of the plaque. It can cancel out the curve.  · Implanting of a device (prosthesis). This can straighten the penis and make it rigid enough for sex.  Your doctor can discuss the risks and benefits of these treatments with you. Be sure to ask questions. Consider all of your options before you choose surgery.  Peyronie disease is hard to cure. Counseling may help you cope with the effects of the disease. It may help you and your partner find ways to deal with it.   Date Last Reviewed: 9/18/2014 © 2000-2016 [employer]. [address] [place] [address]. All rights reserved. This information is not intended as a substitute for professional medical care. Always follow your healthcare professional's instructions.          Benign Prostatic Hyperplasia    The prostate is a small gland that makes semen. As you age, the prostate grows. If it becomes too big, it may cause problems with urination. This condition is called benign prostatic hyperplasia (BPH).  Symptoms of BPH  BPH is common in men over age 60. Thats because the prostate grows bigger during a mans life. As it grows, it presses against the urethra. The urethra carries urine out of your body from your bladder through your penis. Your bladder may also weaken as you age. It may not empty completely after you urinate.  Men with BPH may have these symptoms:  · The urge to frequently urinate, especially at night  · Leaking or dribbling of  urine  · A weak stream of urine  · Not able to urinate, or having trouble starting to urinate  Diagnosing BPH  BPH can hurt your bladder and kidneys. It can also lead to bladder stones and urinary tract infections. If you think you may have BPH, talk with your healthcare provider. Early treatment can prevent problems.  Several tests can diagnose BPH. These include:  · Digital rectal exam. During this procedure, your provider puts a gloved, greased (lubricated) finger into your rectum to check the size of your prostate.  · Imaging tests. X-rays and other imaging tests can find problems in your kidneys or bladder.  · Cystoscopy. This test uses a flexible tube with a camera (scope) to look inside your urinary tract.  · Urine flow study. This test uses a special device to see how fast urine leaves your body.  Treating BPH  If you have mild symptoms, you may not need treatment. You may be able to control your BPH with lifestyle changes. Some men feel better if they limit or avoid alcohol and caffeinated drinks like coffee. Not drinking too many fluids at night can also help. Increasing your physical activity may ease symptoms, too.  Kegel exercises may also help. They strengthen the pelvic muscle to prevent urine from leaking. While urinating, contract your pelvic muscle to stop or slow down the flow of urine. Hold for 10 seconds. Repeat at least 5 times. Do the exercise 3 to 5 times each day.  Certain medicines can worsen BPH symptoms. These include medicines for congestion, allergies, and depression. Medicines that increase your urine flow (diuretics) can also worsen BPH symptoms. If you take any of these, talk with your provider. You may need to take another medicine or change how much you take.  BPH symptoms often get worse as the prostate grows. So at some point you may need treatment. Your provider may prescribe medicine to shrink the prostate or stop its growth. Other treatments can make the urethra wider to let  urine to flow more easily. There are also some minimally invasive techniques to remove prostate tissue.  If your BPH is severe, your health care provider may recommend surgery. Surgery takes out enlarged parts of the prostate gland. Your health care provider can figure out the best option for you based on your age, overall health, and other factors.  BPH and Prostate Cancer  BPH and prostate cancer share some symptoms. Thats why its important to talk with your provider about your symptoms. Men with BPH arent more likely to develop this cancer. But they may have higher levels of the prostate-specific antigen (PSA). A higher PSA level may also be a sign of prostate cancer. Certain tests help distinguish BPH from prostate cancer. They include prostate ultrasound and biopsy.  Date Last Reviewed: 5/31/2015 © 2000-2016 [employer]. [address] [place] [address], [place] [address]. All rights reserved. This information is not intended as a substitute for professional medical care. Always follow your healthcare professional's instructions.

## 2017-08-31 NOTE — TELEPHONE ENCOUNTER
----- Message from Karishma Rossi MD sent at 8/31/2017 10:24 AM CDT -----  This maria luisa had spontanous pneumothorax 20 years ago  Looks like a marfan maria luisa  Said he's been having chest pain for 6 months   Was told everything is normal but should his aorta be evaluated - cxr? Marfan's can get aortic enlargement.  Random I know

## 2017-08-31 NOTE — TELEPHONE ENCOUNTER
Notify the patient that were concerned that he might have Marfan syndrome.  This can sometimes cause an aortic dissection.  We would like to do echocardiogram to rule that out.      Addendum:  Patient was not willing to get the echocardiogram.  He will let me look at them next visit to see if I think he has Marfan's .

## 2017-09-05 ENCOUNTER — TELEPHONE (OUTPATIENT)
Dept: UROLOGY | Facility: CLINIC | Age: 42
End: 2017-09-05

## 2017-09-05 NOTE — TELEPHONE ENCOUNTER
----- Message from Karishma Rossi MD sent at 9/5/2017  8:48 AM CDT -----  Please let pt know 's office in [place] will be contacting him for a f/u appt to discuss possible further evaluation and treatment options for his peyronie's disease.   He will need to bring a picture of his erect penis to his appt with him from different angles.      ----- Message -----  From: Dev Mackey MD  Sent: 9/5/2017   6:38 AM  To: Karishma Rossi MD    Depends on what his doppler shows as to his risk of post op ED.    Dev    ----- Message -----  From: Karishma Rossi MD  Sent: 9/1/2017   1:20 PM  To: Dev Mackey MD    He said he could penetrate but wants it straightened. Told him I wouldn't be able to help with girth or length.   Would u do a a plication or excision/incision if he has no ED?   wouldn't that significantly increase his risk of ed?    ----- Message -----  From: Dev Mackey MD  Sent: 9/1/2017   9:35 AM  To: Karishma Rossi MD    I do xiaflex.  But I think it's garbage.    If he can't penetrate, he needs a doppler u/s to assess.  Can't fix length or girth.  But we could make it easier to penetrate.    Dev    ----- Message -----  From: Karishma Rossi MD  Sent: 8/31/2017  10:26 AM  To: Dev Mackey MD    Are you doing xiaflex?  What do your ecommend for this maria luisa? 90 degree dorsal bend. No problems erection jsut c/o loss of length and girth. Not painful for intercourse. Would like it straightened.

## 2017-09-05 NOTE — TELEPHONE ENCOUNTER
"----- Message from Karishma Rossi MD sent at 9/5/2017  8:48 AM CDT -----  Please make next avail appt with  for "peyronie's"  ----- Message -----  From: Dev Mackey MD  Sent: 9/5/2017   6:38 AM  To: Karishma Rossi MD    Depends on what his doppler shows as to his risk of post op ED.    Dev    ----- Message -----  From: Karishma Rossi MD  Sent: 9/1/2017   1:20 PM  To: Dev Mackey MD    He said he could penetrate but wants it straightened. Told him I wouldn't be able to help with girth or length.   Would u do a a plication or excision/incision if he has no ED?   wouldn't that significantly increase his risk of ed?    ----- Message -----  From: Dev Mackey MD  Sent: 9/1/2017   9:35 AM  To: Karishma Rossi MD    I do xiaflex.  But I think it's garbage.    If he can't penetrate, he needs a doppler u/s to assess.  Can't fix length or girth.  But we could make it easier to penetrate.    Dev    ----- Message -----  From: Karishma Rossi MD  Sent: 8/31/2017  10:26 AM  To: Dev Mackey MD    Are you doing xiaflex?  What do your ecommend for this maria luisa? 90 degree dorsal bend. No problems erection jsut c/o loss of length and girth. Not painful for intercourse. Would like it straightened.           "

## 2017-09-25 ENCOUNTER — OFFICE VISIT (OUTPATIENT)
Dept: UROLOGY | Facility: CLINIC | Age: 42
End: 2017-09-25
Payer: COMMERCIAL

## 2017-09-25 VITALS
WEIGHT: 149.94 LBS | HEIGHT: 72 IN | DIASTOLIC BLOOD PRESSURE: 91 MMHG | BODY MASS INDEX: 20.31 KG/M2 | HEART RATE: 56 BPM | SYSTOLIC BLOOD PRESSURE: 141 MMHG

## 2017-09-25 DIAGNOSIS — N48.6 PEYRONIE'S DISEASE: Primary | ICD-10-CM

## 2017-09-25 DIAGNOSIS — A63.0 CONDYLOMA: ICD-10-CM

## 2017-09-25 PROCEDURE — 3008F BODY MASS INDEX DOCD: CPT | Mod: S$GLB,,, | Performed by: UROLOGY

## 2017-09-25 PROCEDURE — 99214 OFFICE O/P EST MOD 30 MIN: CPT | Mod: S$GLB,,, | Performed by: UROLOGY

## 2017-09-25 PROCEDURE — 99999 PR PBB SHADOW E&M-EST. PATIENT-LVL III: CPT | Mod: PBBFAC,,, | Performed by: UROLOGY

## 2017-09-25 RX ORDER — PERMETHRIN 50 MG/G
CREAM TOPICAL
Refills: 0 | COMMUNITY
Start: 2017-09-01 | End: 2019-07-11 | Stop reason: ALTCHOICE

## 2017-09-25 RX ORDER — PODOFILOX 5 MG/G
GEL TOPICAL 2 TIMES DAILY
Qty: 3.5 G | Refills: 1 | Status: SHIPPED | OUTPATIENT
Start: 2017-09-25 | End: 2019-07-11

## 2017-09-25 NOTE — PATIENT INSTRUCTIONS
What Is Peyronies Disease?  A slight natural curve to the erect penis is usually normal. But curvature that causes pain and difficulty with intercourse is a problem. The development of painful curvature is called Peyronies disease. Peyronies disease is due to a plaque (scar) that forms inside the penis.    Your Penile Anatomy  The shaft (body) of the penis consists of the corpora cavernosa, two columns of spongy tissue. During an erection, this tissue fills with blood, swells, and becomes rigid, creating an erection. A dense sheath of elastic tissue called the tunica surrounds the corpora. This tissue stretches as the penis becomes erect.    Painful Curvature  Peyronies disease occurs when a plaque (scar) develops on the fibrous sheath of tissue surrounding the corpora. The scar can form on any part of the penis, but often is found on the top or bottom. The scarred area of the tunica loses its elasticity, and so doesnt stretch when the corpora swell. Because the tunica doesnt stretch in that area, the erect penis curves in the direction of the scar.  Possible Causes   No one is sure just what causes the plaque. It may be the result of an injury to the erect penis or a blow to the groin. The plaque may also occur because of a problem with your immune system, which normally helps your body fight disease. Or the plaque may form for other, still unknown, reasons. It is certain, however, that Peyronies disease is not caused by sexually transmitted diseases and is not cancer.    Symptoms of Peyronies Disease  · Curvature of the penis during erection (which may interfere with intercourse)  · Pain during erection  · Soft erections  · Shortening or narrowing of the penis  A hard area is usually felt below the skin of the penis in the area of the plaque.  © 6908-3292 [employer], [address] [place] [address], [place] [address]. All rights reserved. This information is not intended as a substitute for professional  medical care. Always follow your healthcare professional's instructions.

## 2017-09-25 NOTE — LETTER
September 25, 2017      Karishma Rossi MD  [address] [place] [address] Drive  [address]  [facility] [address]           [facility] - Urology 4th Floor  [address]  [place] [address]  Phone: [phone]          Patient: Jabier Doss   MR Number: [id number]   YOB: 1975   Date of Visit: 9/25/2017       Dear Dr. Karishma Rossi:    Thank you for referring Jabier Doss to me for evaluation. Attached you will find relevant portions of my assessment and plan of care.    If you have questions, please do not hesitate to call me. I look forward to following Jabier Doss along with you.    Sincerely,    Dev Mackey MD    Enclosure  CC:  No Recipients    If you would like to receive this communication electronically, please contact [email or web address] or [phone] to request more information on [employer] Link access.    For providers and/or their staff who would like to refer a patient to [facility], please contact us through our one-stop-shop provider referral line, [facility] , at [phone].    If you feel you have received this communication in error or would no longer like to receive these types of communications, please e-mail [email or web address]

## 2017-09-25 NOTE — PROGRESS NOTES
CHIEF COMPLAINT:    Mr. Doss is a 41 y.o. male presenting for a consultation at the request of Dr. Rossi. Patient presents with peyronie's disease.    PRESENTING ILLNESS:    Jabier Doss is a 41 y.o. male who c/o peyronie's disease.  This has been present for > 1 year.  He has ~ a 40 degree dorsal curve.  He can penetrate easily.  Sex is not painful for him or his partner.  He denies ED.  His main complaint is decreased penile size.  This is causing him physiological distress.    He has nocturia x 0-1.  Slight decreased FOS.  He's pleased with how he voids.  No hematuria.  No dysuria.     REVIEW OF SYSTEMS:    Jabier Doss denies any history of headache, blurred vision, fever, nausea, vomiting, chills, abdominal pain, bleeding per rectum, cough, SOB, recent loss of consciousness, recent mental status changes, seizures, dizziness, or upper or lower extremity weakness.    KATIE  1. 4  2. 4  3. 5  4. 4  5. 5      PATIENT HISTORY:    Past Medical History:   Diagnosis Date    Depression        Past Surgical History:   Procedure Laterality Date    PLEURAL SCARIFICATION Left        Family History   Problem Relation Age of Onset    No Known Problems Mother     Heart disease Father     Hyperlipidemia Father     Hypertension Father     No Known Problems Brother        Social History     Social History    Marital status:      Spouse name: N/A    Number of children: N/A    Years of education: N/A     Occupational History    Not on file.     Social History Main Topics    Smoking status: Former Smoker     Quit date: 4/27/2017    Smokeless tobacco: Never Used    Alcohol use 0.0 oz/week      Comment: rarely    Drug use: No    Sexual activity: Not on file     Other Topics Concern    Not on file     Social History Narrative    No narrative on file       Allergies:  Review of patient's allergies indicates no known allergies.    Medications:    Current Outpatient Prescriptions:     cetirizine  (ZYRTEC) 10 mg Cap, Take by mouth., Disp: , Rfl:     omeprazole (PRILOSEC) 20 MG capsule, Take 1 capsule (20 mg total) by mouth once daily., Disp: 30 capsule, Rfl: 11    aspirin 81 MG Chew, Take 1 tablet (81 mg total) by mouth once daily., Disp: , Rfl:     fluocinonide 0.05% (LIDEX) 0.05 % cream, Apply topically 2 (two) times daily., Disp: 120 g, Rfl: 3    nitroGLYCERIN (NITROSTAT) 0.4 MG SL tablet, Place 1 tablet (0.4 mg total) under the tongue every 5 (five) minutes as needed for Chest pain., Disp: 20 tablet, Rfl: 1    permethrin (ELIMITE) 5 % cream, APPLY FROM HEAD TO TOE FOR 12 HOURS . EVERYONE IN FAMILY SHOULD DO THIS, Disp: , Rfl: 0    podofilox (CONDYLOX) 0.5 % gel, Apply topically 2 (two) times daily., Disp: 3.5 g, Rfl: 1    sumatriptan (IMITREX) 100 MG tablet, Take 1 tablet (100 mg total) by mouth once. No more than twice in a 24 hour period, Disp: 6 tablet, Rfl: 11    PHYSICAL EXAMINATION:    The patient generally appears in good health, is appropriately interactive, and is in no apparent distress.     Eyes: anicteric sclerae, moist conjunctivae; no lid-lag; PERRLA     HENT: Atraumatic; oropharynx clear with moist mucous membranes and no mucosal ulcerations;normal hard and soft palate.  No evidence of lymphadenopathy.    Neck: Trachea midline.  No thyromegaly.    Musculoskeletal: No abnormal gait.    Skin: No lesions.    Mental: Cooperative with normal affect.  Is oriented to time, place, and person.    Neuro: Grossly intact.    Chest: Normal inspiratory effort.   No accessory muscles.  No audible wheezes.  Respirations symmetric on inspiration and expiration.    Heart: Regular rhythm.      Abdomen:  Soft, non-tender. No masses or organomegaly. Bladder is not palpable. No evidence of flank discomfort. No evidence of inguinal hernia.    Genitourinary: The penis is circumcised with a plaque c/w peyronie's on the shaft.  Multiple lesions c/w condyloma on the shaft. The urethral meatus is normal. The  testes, epididymides, and cord structures are normal in size and contour bilaterally. The scrotum is normal in size and contour.    Extremities: No clubbing, cyanosis, or edema      LABS:      No results found for: PSA, PSADIAG, PSATOTAL, PSAFREE, PSAFREEPCT    IMPRESSION:    Encounter Diagnoses   Name Primary?    Peyronie's disease Yes    Condyloma          PLAN:    1. Discussed that he can penetrate easily and sex is not painful.  Do not recommend correction.  Discussed that is main compliant is decreased penile size which cannot be fixed.  Recommend seeing a sex therapist to work on the mental side of his distress.  2. Will use condylox for the condyloma. Side effects discussed.  A new Rx was given.  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  3. RTC prn    Copy to: Bre

## 2017-10-18 ENCOUNTER — PATIENT MESSAGE (OUTPATIENT)
Dept: UROLOGY | Facility: CLINIC | Age: 42
End: 2017-10-18

## 2018-06-09 DIAGNOSIS — R07.89 OTHER CHEST PAIN: ICD-10-CM

## 2018-06-13 DIAGNOSIS — R07.89 OTHER CHEST PAIN: ICD-10-CM

## 2018-06-13 RX ORDER — OMEPRAZOLE 20 MG/1
20 CAPSULE, DELAYED RELEASE ORAL DAILY
Qty: 30 CAPSULE | Refills: 11 | Status: SHIPPED | OUTPATIENT
Start: 2018-06-13 | End: 2019-07-11

## 2018-06-21 RX ORDER — OMEPRAZOLE 20 MG/1
CAPSULE, DELAYED RELEASE ORAL
Qty: 30 CAPSULE | Refills: 0 | Status: SHIPPED | OUTPATIENT
Start: 2018-06-21 | End: 2019-07-11

## 2019-07-11 ENCOUNTER — DOCUMENTATION ONLY (OUTPATIENT)
Dept: FAMILY MEDICINE | Facility: CLINIC | Age: 44
End: 2019-07-11

## 2019-07-11 ENCOUNTER — OFFICE VISIT (OUTPATIENT)
Dept: FAMILY MEDICINE | Facility: CLINIC | Age: 44
End: 2019-07-11
Payer: COMMERCIAL

## 2019-07-11 VITALS
OXYGEN SATURATION: 97 % | HEART RATE: 94 BPM | BODY MASS INDEX: 19.68 KG/M2 | DIASTOLIC BLOOD PRESSURE: 78 MMHG | HEIGHT: 72 IN | WEIGHT: 145.31 LBS | TEMPERATURE: 98 F | SYSTOLIC BLOOD PRESSURE: 124 MMHG

## 2019-07-11 DIAGNOSIS — R61 UNEXPLAINED NIGHT SWEATS: Primary | ICD-10-CM

## 2019-07-11 PROCEDURE — 99213 OFFICE O/P EST LOW 20 MIN: CPT | Mod: S$GLB,,, | Performed by: NURSE PRACTITIONER

## 2019-07-11 PROCEDURE — 99213 PR OFFICE/OUTPT VISIT, EST, LEVL III, 20-29 MIN: ICD-10-PCS | Mod: S$GLB,,, | Performed by: NURSE PRACTITIONER

## 2019-07-11 NOTE — PROGRESS NOTES
Subjective:       Patient ID: Jabier Doss is a 43 y.o. male.    Chief Complaint: Night Sweats  This is an established patient of Dr. Franco, but I have never seen him before.   Started with nausea and poor appetite 3 days ago. Had severe myalgias and night sweats. The body aches are improving, but he continues to have hot and cold flashes throughout the day and night. Denies any illicit drug use or any sexual encounters other than his wife. He denies any recent weight loss, diarrhea or shakiness with the sweating.   HPI  Review of Systems   Constitutional: Positive for chills, diaphoresis and fatigue. Negative for fever and unexpected weight change.   HENT: Negative for congestion, hearing loss and sore throat.    Eyes: Negative for pain, redness and visual disturbance.   Respiratory: Negative for cough, shortness of breath and wheezing.    Cardiovascular: Negative for chest pain and leg swelling.   Gastrointestinal: Negative for constipation, diarrhea, nausea and vomiting.   Endocrine: Positive for polydipsia and polyuria. Negative for cold intolerance, heat intolerance and polyphagia.   Genitourinary: Negative for dysuria and hematuria.   Musculoskeletal: Positive for myalgias. Negative for arthralgias.   Skin: Negative for pallor and rash.   Neurological: Negative for dizziness and headaches.   Psychiatric/Behavioral: Negative for dysphoric mood. The patient is not nervous/anxious.        Objective:      Physical Exam   Constitutional: He is oriented to person, place, and time. He appears well-developed and well-nourished. No distress.   HENT:   Head: Normocephalic and atraumatic.   Eyes: Conjunctivae are normal. Right eye exhibits no discharge. Left eye exhibits no discharge. No scleral icterus.   Cardiovascular: Normal rate, regular rhythm and normal heart sounds. Exam reveals no gallop and no friction rub.   No murmur heard.  Pulmonary/Chest: Effort normal and breath sounds normal. No stridor. No  respiratory distress. He has no wheezes. He has no rales.   Musculoskeletal: He exhibits no edema.   Neurological: He is alert and oriented to person, place, and time.   Skin: Skin is warm and dry. Capillary refill takes 2 to 3 seconds. No rash noted. He is not diaphoretic. No pallor.   Psychiatric: He has a normal mood and affect. His behavior is normal.   Nursing note and vitals reviewed.      Assessment:     This office visit cannot be billed incident to as it does not meet the needed criteria.     1. Unexplained night sweats        Plan:       Unexplained night sweats  -     Comprehensive metabolic panel; Future; Expected date: 07/11/2019  -     TSH; Future; Expected date: 07/11/2019  -     CBC auto differential; Future; Expected date: 07/11/2019  -     HIV 1/2 Ag/Ab (4th Gen); Future; Expected date: 07/11/2019  -     GLUCOSE TOLERANCE, 3 HOURS; Future; Expected date: 07/11/2019    1 week with labs prior

## 2019-07-14 ENCOUNTER — PATIENT MESSAGE (OUTPATIENT)
Dept: FAMILY MEDICINE | Facility: CLINIC | Age: 44
End: 2019-07-14

## 2019-07-15 ENCOUNTER — PATIENT MESSAGE (OUTPATIENT)
Dept: FAMILY MEDICINE | Facility: CLINIC | Age: 44
End: 2019-07-15

## 2019-07-20 ENCOUNTER — LAB VISIT (OUTPATIENT)
Dept: LAB | Facility: HOSPITAL | Age: 44
End: 2019-07-20
Attending: NURSE PRACTITIONER
Payer: COMMERCIAL

## 2019-07-20 DIAGNOSIS — R61 UNEXPLAINED NIGHT SWEATS: ICD-10-CM

## 2019-07-20 LAB
ALBUMIN SERPL BCP-MCNC: 3.9 G/DL (ref 3.5–5.2)
ALP SERPL-CCNC: 108 U/L (ref 55–135)
ALT SERPL W/O P-5'-P-CCNC: 23 U/L (ref 10–44)
ANION GAP SERPL CALC-SCNC: 11 MMOL/L (ref 8–16)
AST SERPL-CCNC: 37 U/L (ref 10–40)
BASOPHILS # BLD AUTO: 0.06 K/UL (ref 0–0.2)
BASOPHILS NFR BLD: 0.5 % (ref 0–1.9)
BILIRUB SERPL-MCNC: 0.3 MG/DL (ref 0.1–1)
BUN SERPL-MCNC: 13 MG/DL (ref 6–20)
CALCIUM SERPL-MCNC: 10.1 MG/DL (ref 8.7–10.5)
CHLORIDE SERPL-SCNC: 102 MMOL/L (ref 95–110)
CO2 SERPL-SCNC: 26 MMOL/L (ref 23–29)
CREAT SERPL-MCNC: 0.9 MG/DL (ref 0.5–1.4)
DIFFERENTIAL METHOD: ABNORMAL
EOSINOPHIL # BLD AUTO: 0.7 K/UL (ref 0–0.5)
EOSINOPHIL NFR BLD: 5.2 % (ref 0–8)
ERYTHROCYTE [DISTWIDTH] IN BLOOD BY AUTOMATED COUNT: 12.5 % (ref 11.5–14.5)
EST. GFR  (AFRICAN AMERICAN): >60 ML/MIN/1.73 M^2
EST. GFR  (NON AFRICAN AMERICAN): >60 ML/MIN/1.73 M^2
GLUCOSE SERPL-MCNC: 107 MG/DL (ref 70–110)
GLUCOSE SERPL-MCNC: 171 MG/DL
GLUCOSE SERPL-MCNC: 63 MG/DL
GLUCOSE SERPL-MCNC: 73 MG/DL
GLUCOSE SERPL-MCNC: 97 MG/DL (ref 70–110)
HCT VFR BLD AUTO: 47.4 % (ref 40–54)
HGB BLD-MCNC: 15.3 G/DL (ref 14–18)
IMM GRANULOCYTES # BLD AUTO: 0.07 K/UL (ref 0–0.04)
IMM GRANULOCYTES NFR BLD AUTO: 0.6 % (ref 0–0.5)
LYMPHOCYTES # BLD AUTO: 2.6 K/UL (ref 1–4.8)
LYMPHOCYTES NFR BLD: 21 % (ref 18–48)
MCH RBC QN AUTO: 31.7 PG (ref 27–31)
MCHC RBC AUTO-ENTMCNC: 32.3 G/DL (ref 32–36)
MCV RBC AUTO: 98 FL (ref 82–98)
MONOCYTES # BLD AUTO: 0.9 K/UL (ref 0.3–1)
MONOCYTES NFR BLD: 7.6 % (ref 4–15)
NEUTROPHILS # BLD AUTO: 8.1 K/UL (ref 1.8–7.7)
NEUTROPHILS NFR BLD: 65.1 % (ref 38–73)
NRBC BLD-RTO: 0 /100 WBC
PLATELET # BLD AUTO: 521 K/UL (ref 150–350)
PMV BLD AUTO: 8.3 FL (ref 9.2–12.9)
POTASSIUM SERPL-SCNC: 4.3 MMOL/L (ref 3.5–5.1)
PROT SERPL-MCNC: 8.3 G/DL (ref 6–8.4)
RBC # BLD AUTO: 4.82 M/UL (ref 4.6–6.2)
SODIUM SERPL-SCNC: 139 MMOL/L (ref 136–145)
TSH SERPL DL<=0.005 MIU/L-ACNC: 0.9 UIU/ML (ref 0.4–4)
WBC # BLD AUTO: 12.39 K/UL (ref 3.9–12.7)

## 2019-07-20 PROCEDURE — 84443 ASSAY THYROID STIM HORMONE: CPT

## 2019-07-20 PROCEDURE — 82951 GLUCOSE TOLERANCE TEST (GTT): CPT

## 2019-07-20 PROCEDURE — 82952 GTT-ADDED SAMPLES: CPT

## 2019-07-20 PROCEDURE — 85025 COMPLETE CBC W/AUTO DIFF WBC: CPT

## 2019-07-20 PROCEDURE — 80053 COMPREHEN METABOLIC PANEL: CPT

## 2019-07-20 PROCEDURE — 36415 COLL VENOUS BLD VENIPUNCTURE: CPT | Mod: PO

## 2019-07-20 PROCEDURE — 86703 HIV-1/HIV-2 1 RESULT ANTBDY: CPT

## 2019-07-22 LAB — HIV 1+2 AB+HIV1 P24 AG SERPL QL IA: NEGATIVE

## 2019-07-23 ENCOUNTER — PATIENT MESSAGE (OUTPATIENT)
Dept: FAMILY MEDICINE | Facility: CLINIC | Age: 44
End: 2019-07-23

## 2019-08-02 ENCOUNTER — OFFICE VISIT (OUTPATIENT)
Dept: FAMILY MEDICINE | Facility: CLINIC | Age: 44
End: 2019-08-02
Payer: COMMERCIAL

## 2019-08-02 ENCOUNTER — DOCUMENTATION ONLY (OUTPATIENT)
Dept: FAMILY MEDICINE | Facility: CLINIC | Age: 44
End: 2019-08-02

## 2019-08-02 VITALS
DIASTOLIC BLOOD PRESSURE: 70 MMHG | WEIGHT: 147.06 LBS | BODY MASS INDEX: 19.92 KG/M2 | SYSTOLIC BLOOD PRESSURE: 102 MMHG | HEIGHT: 72 IN | HEART RATE: 72 BPM | OXYGEN SATURATION: 96 % | TEMPERATURE: 98 F

## 2019-08-02 DIAGNOSIS — Z23 NEED FOR DIPHTHERIA-TETANUS-PERTUSSIS (TDAP) VACCINE: ICD-10-CM

## 2019-08-02 DIAGNOSIS — E16.1 REACTIVE HYPOGLYCEMIA: Primary | ICD-10-CM

## 2019-08-02 PROCEDURE — 90471 TDAP VACCINE GREATER THAN OR EQUAL TO 7YO IM: ICD-10-PCS | Mod: S$GLB,,, | Performed by: NURSE PRACTITIONER

## 2019-08-02 PROCEDURE — 90471 IMMUNIZATION ADMIN: CPT | Mod: S$GLB,,, | Performed by: NURSE PRACTITIONER

## 2019-08-02 PROCEDURE — 90715 TDAP VACCINE 7 YRS/> IM: CPT | Mod: S$GLB,,, | Performed by: NURSE PRACTITIONER

## 2019-08-02 PROCEDURE — 90715 TDAP VACCINE GREATER THAN OR EQUAL TO 7YO IM: ICD-10-PCS | Mod: S$GLB,,, | Performed by: NURSE PRACTITIONER

## 2019-08-02 PROCEDURE — 99214 OFFICE O/P EST MOD 30 MIN: CPT | Mod: 25,S$GLB,, | Performed by: NURSE PRACTITIONER

## 2019-08-02 PROCEDURE — 99214 PR OFFICE/OUTPT VISIT, EST, LEVL IV, 30-39 MIN: ICD-10-PCS | Mod: 25,S$GLB,, | Performed by: NURSE PRACTITIONER

## 2019-08-02 RX ORDER — DEXTROSE 4 G
1 TABLET,CHEWABLE ORAL DAILY
Qty: 1 EACH | Refills: 0 | Status: SHIPPED | OUTPATIENT
Start: 2019-08-02 | End: 2020-08-01

## 2019-08-02 NOTE — PROGRESS NOTES
Subjective:       Patient ID: Jabier Doss is a 43 y.o. male.    Chief Complaint: Discuss labs  Continues to have night sweats intermittently. Is here to discuss why and get his lab results. Was found to have reactive hypoglycemia on 3 hour GTT. Tries to eat a healthy diet, but does snack on high carb foods in the evenings and has an occasional beer. He has a sandwich and chips for lunch usually. Denied any hypoglycemia during the day until we discussed the symptoms and then he realized he was having it a lot more than he thought. His father has type 2 diabetes.     Has elevated platelets, but everything else is normal. Probably acute phase reactant, but has no apparent illness.   HPI  Review of Systems   Constitutional: Positive for diaphoresis. Negative for fatigue, fever and unexpected weight change.   HENT: Negative for congestion, hearing loss and sore throat.    Eyes: Negative for pain, redness and visual disturbance.   Respiratory: Negative for cough and shortness of breath.    Cardiovascular: Negative for chest pain and leg swelling.   Gastrointestinal: Positive for nausea. Negative for constipation, diarrhea and vomiting.   Endocrine: Negative for cold intolerance and heat intolerance.   Genitourinary: Negative for dysuria and hematuria.   Musculoskeletal: Negative for arthralgias and myalgias.   Skin: Negative for pallor and rash.   Neurological: Positive for headaches. Negative for dizziness.   Psychiatric/Behavioral: Negative for dysphoric mood. The patient is not nervous/anxious.        Objective:       Lab Results   Component Value Date    WBC 12.39 07/20/2019    HGB 15.3 07/20/2019    HCT 47.4 07/20/2019    MCV 98 07/20/2019     (H) 07/20/2019     CMP  Sodium   Date Value Ref Range Status   07/20/2019 139 136 - 145 mmol/L Final     Potassium   Date Value Ref Range Status   07/20/2019 4.3 3.5 - 5.1 mmol/L Final     Chloride   Date Value Ref Range Status   07/20/2019 102 95 - 110 mmol/L  Final     CO2   Date Value Ref Range Status   07/20/2019 26 23 - 29 mmol/L Final     Glucose   Date Value Ref Range Status   07/20/2019 107 70 - 110 mg/dL Final     BUN, Bld   Date Value Ref Range Status   07/20/2019 13 6 - 20 mg/dL Final     Creatinine   Date Value Ref Range Status   07/20/2019 0.9 0.5 - 1.4 mg/dL Final     Calcium   Date Value Ref Range Status   07/20/2019 10.1 8.7 - 10.5 mg/dL Final     Total Protein   Date Value Ref Range Status   07/20/2019 8.3 6.0 - 8.4 g/dL Final     Albumin   Date Value Ref Range Status   07/20/2019 3.9 3.5 - 5.2 g/dL Final     Total Bilirubin   Date Value Ref Range Status   07/20/2019 0.3 0.1 - 1.0 mg/dL Final     Comment:     For infants and newborns, interpretation of results should be based  on gestational age, weight and in agreement with clinical  observations.  Premature Infant recommended reference ranges:  Up to 24 hours.............<8.0 mg/dL  Up to 48 hours............<12.0 mg/dL  3-5 days..................<15.0 mg/dL  6-29 days.................<15.0 mg/dL       Alkaline Phosphatase   Date Value Ref Range Status   07/20/2019 108 55 - 135 U/L Final     AST   Date Value Ref Range Status   07/20/2019 37 10 - 40 U/L Final     ALT   Date Value Ref Range Status   07/20/2019 23 10 - 44 U/L Final     Anion Gap   Date Value Ref Range Status   07/20/2019 11 8 - 16 mmol/L Final     eGFR if    Date Value Ref Range Status   07/20/2019 >60.0 >60 mL/min/1.73 m^2 Final     eGFR if non    Date Value Ref Range Status   07/20/2019 >60.0 >60 mL/min/1.73 m^2 Final     Comment:     Calculation used to obtain the estimated glomerular filtration  rate (eGFR) is the CKD-EPI equation.        Lab Results   Component Value Date    [id number] Negative 07/20/2019     Lab Results   Component Value Date    TSH 0.897 07/20/2019   GLUCOSE TOLERANCE, 3 HOURS   Order: [id number]   Status:  Final result   Visible to patient:  Yes (Patient Portal) Next appt:  None Dx:   Unexplained night sweats    Ref Range & Units 13d ago   Gluc Fast 70 - 110 mg/dL 97    Gluc 1 HR mg/dL 171    Gluc 2 HR mg/dL 63    Gluc 3 HR mg/dL 73    Resulting Agency  [facility]         Specimen Collected: 07/20/19 09:25             Physical Exam   Constitutional: He is oriented to person, place, and time. He appears well-developed and well-nourished. No distress.   HENT:   Head: Normocephalic and atraumatic.   Eyes: Conjunctivae are normal. Right eye exhibits no discharge. Left eye exhibits no discharge. No scleral icterus.   Cardiovascular: Normal rate, regular rhythm and normal heart sounds. Exam reveals no gallop and no friction rub.   No murmur heard.  Pulmonary/Chest: Effort normal and breath sounds normal. No stridor. No respiratory distress. He has no wheezes. He has no rales.   Musculoskeletal: He exhibits no edema.   Neurological: He is alert and oriented to person, place, and time.   Skin: Skin is warm and dry. No rash noted. He is not diaphoretic. No pallor.   Psychiatric: He has a normal mood and affect. His behavior is normal.   Nursing note and vitals reviewed.      Assessment:     This provider spent  25 minutes face to face with patient, more than half the time for counseling and coordination of care as noted.    1. Reactive hypoglycemia    2. Need for diphtheria-tetanus-pertussis (Tdap) vaccine        Plan:     Discussed reactive hypoglycemia, the symptoms, treatment and prevention of hypoglycemia as well as it being a precursor to type 2 diabetes (which his father currently has).   Follow a low carbohydrate, high protein diet. Make sure you have a snack before bedtime with some protein like meat, poultry, eggs, cheese or peanut butter. Keep the crackers to a minimum.

## 2019-08-02 NOTE — PATIENT INSTRUCTIONS
frequent (every three hours) small meals or snacks, consuming foods high in fiber, avoiding foods high in sugar, flour, corn and corn syrup and a regular exercise regimen have been recommended Follow a low carbohydrate, high protein diet. Make sure you have a snack before bedtime with some protein like meat, poultry, eggs, cheese or peanut butter (or nuts). Keep the crackers to a minimum.

## 2020-08-10 ENCOUNTER — OFFICE VISIT (OUTPATIENT)
Dept: PRIMARY CARE CLINIC | Facility: CLINIC | Age: 45
End: 2020-08-10
Payer: COMMERCIAL

## 2020-08-10 VITALS
TEMPERATURE: 98 F | RESPIRATION RATE: 18 BRPM | DIASTOLIC BLOOD PRESSURE: 74 MMHG | SYSTOLIC BLOOD PRESSURE: 127 MMHG | OXYGEN SATURATION: 99 % | HEART RATE: 84 BPM

## 2020-08-10 DIAGNOSIS — Z20.822 SUSPECTED COVID-19 VIRUS INFECTION: ICD-10-CM

## 2020-08-10 PROCEDURE — 99203 PR OFFICE/OUTPT VISIT, NEW, LEVL III, 30-44 MIN: ICD-10-PCS | Mod: S$GLB,,, | Performed by: EMERGENCY MEDICINE

## 2020-08-10 PROCEDURE — U0003 INFECTIOUS AGENT DETECTION BY NUCLEIC ACID (DNA OR RNA); SEVERE ACUTE RESPIRATORY SYNDROME CORONAVIRUS 2 (SARS-COV-2) (CORONAVIRUS DISEASE [COVID-19]), AMPLIFIED PROBE TECHNIQUE, MAKING USE OF HIGH THROUGHPUT TECHNOLOGIES AS DESCRIBED BY CMS-2020-01-R: HCPCS

## 2020-08-10 PROCEDURE — 99203 OFFICE O/P NEW LOW 30 MIN: CPT | Mod: S$GLB,,, | Performed by: EMERGENCY MEDICINE

## 2020-08-10 NOTE — PATIENT INSTRUCTIONS
Instructions for Patients with Confirmed or Suspected COVID-19    If you are awaiting your test result, you will either be called or it will be released to the patient portal.  If you have any questions about your test, please visit [email or web address] or call our COVID-19 information line at [phone].      Instructions for non-hospitalized or discharged patients with confirmed or suspected COVID-19:       Stay home except to get medical care.    Separate yourself from other people and animals in your home.    Call ahead before visiting your doctor.    Wear a face mask.    Cover your coughs and sneezes.    Clean your hands often.    Avoid sharing personal household items.    Clean all high-touch surfaces every day.    Monitor your symptoms. Seek prompt medical attention if your illness is worsening (e.g., difficulty breathing). Before seeking care, call your healthcare provider.    If you have a medical emergency and must call 911, notify the dispatcher that you have or are being evaluated for COVID-19. If possible, put on a face mask before emergency medical services arrive.    Use the following symptom-based strategy to return to normal activity following a suspected or confirmed case of COVID-19. Continue isolation until:   o At least 3 days (72 hours) have passed since recovery defined as resolution of fever without the use of fever-reducing medications and improvement in respiratory symptoms (e.g. cough, shortness of breath), and   o At least 10 days have passed since the first positive test.       As one of the next steps, you will receive a call or text from the [place] Department of Health ([facility]) COVID-19 Tracing Team. See the contact information below so you know not to ignore the health departments call. It is important that you contact them back immediately so they can help.     Contact Tracer Number:  [phone]  Caller ID for most carriers: [place] [facility]    What is  contact tracing?   Contact tracing is a process that helps identify everyone who has been in close contact with an infected person. Contact tracers let those people know they may have been exposed and guide them on next steps. Confidentiality is important for everyone; no one will be told who may have exposed them to the virus.   Your involvement is important. The more we know about where and how this virus is spreading, the better chance we have at stopping it from spreading further.  What does exposure mean?   Exposure means you have been within 6 feet for more than 15 minutes with a person who has or had COVID-19.  What kind of questions do the contact tracers ask?   A contact tracer will confirm your basic contact information including name, address, phone number, and next of kin, as well as asking about any symptoms you may have had. Theyll also ask you how you think you may have gotten sick, such as places where you may have been exposed to the virus, and people you were with. Those names will never be shared with anyone outside of that call, and will only be used to help trace and stop the spread of the virus.   I have privacy concerns. How will the state use my information?   Your privacy about your health is important. All calls are completed using call centers that use the appropriate health privacy protection measures (HIPAA compliance), meaning that your patient information is safe. No one will ever ask you any questions related to immigration status. Your health comes first.   Do I have to participate?   You do not have to participate, but we strongly encourage you to. Contact tracing can help us catch and control new outbreaks as theyre developing to keep your friends and family safe.   What if I dont hear from anyone?   If you dont receive a call within 24 hours, you can call the number above right away to inquire about your status. That line is open from 8:00 am - 8:00 p.m., 7 days a  week.  Contact tracing saves lives! Together, we have the power to beat this virus and keep our loved ones and neighbors safe.       Instructions for household members, intimate partners and caregivers in a non-healthcare setting of a patient with confirmed or suspected COVID-19:         Close contacts should monitor their health and call their healthcare provider right away if they develop symptoms suggestive of COVID-19 (e.g., fever, cough, shortness of breath).    Stay home except to get medical care. Separate yourself from other people and animals in the home.   Monitor the patients symptoms. If the patient is getting sicker, call his or her healthcare provider. If the patient has a medical emergency and you need to call 911, notify the dispatch personnel that the patient has or is being evaluated for COVID-19.    Wear a facemask when around other people such as sharing a room or vehicle and before entering a healthcare provider's office.   Cover coughs and sneezes with a tissue. Throw used tissues in a lined trash can immediately and wash hands.   Clean hands often with soap and water for at least 20 seconds or with an alcohol-based hand , rubbing hands together until they feel dry. Avoid touching your eyes, nose, and mouth with unwashed hands.   Clean all high-touch; surfaces every day, including counters, tabletops, doorknobs, bathroom fixtures, toilets, phones, keyboards, tablets, bedside tables, etc. Use a household cleaning spray or wipe according to label instructions.   Avoid sharing personal household items such as dishes, drinking glasses, cups, towels, bedding, etc. After these items are used, they should be washed thoroughly with soap and water.   Continue isolation until:   At least 3 days (72 hours) have passed since recovery defined as resolution of fever without the use of fever-reducing medications and improvement in respiratory symptoms (e.g. cough, shortness of breath),  and    At least 10 days have passed since the patients first positive test.    https://www.cdc.gov/coronavirus/2019-ncov/your-health/index.htm

## 2020-08-10 NOTE — PROGRESS NOTES
Subjective:        Time seen by provider: 2:35 PM on 08/10/2020    Jabier Doss is a 44 y.o. male with depression who presents for an evaluation of possible COVID-19. He complains of cough.The patient states his symptoms began 2 days ago and was exposed to a positive coworker 5 days ago. No pertinent PSHx.     Review of Systems   Constitutional: Negative for activity change, appetite change, fatigue and fever.   HENT: Negative for congestion, rhinorrhea and sore throat.    Respiratory: Positive for cough. Negative for chest tightness, shortness of breath and wheezing.    Cardiovascular: Negative for chest pain and palpitations.   Gastrointestinal: Negative for diarrhea, nausea and vomiting.   Musculoskeletal: Negative for arthralgias and myalgias.   Skin: Negative for rash.   Neurological: Negative for weakness, light-headedness and headaches.       Objective:      Physical Exam  Vitals signs and nursing note reviewed.   Constitutional:       General: He is not in acute distress.     Appearance: He is well-developed. He is not diaphoretic.   HENT:      Head: Normocephalic and atraumatic.      Nose: Nose normal.   Eyes:      Conjunctiva/sclera: Conjunctivae normal.   Neck:      Musculoskeletal: Normal range of motion.   Cardiovascular:      Rate and Rhythm: Normal rate and regular rhythm.      Heart sounds: Normal heart sounds. No murmur.   Pulmonary:      Effort: Pulmonary effort is normal. No respiratory distress.      Breath sounds: Normal breath sounds. No wheezing.   Musculoskeletal: Normal range of motion.   Skin:     General: Skin is warm and dry.   Neurological:      Mental Status: He is alert and oriented to person, place, and time.         Assessment:       1. Suspected COVID-19  COVID-19 Routine Screening     Plan:       1. Suspected COVID-19  COVID-19 Routine Screening     2. Discharge home and await results.   3. Return to clinic or ED for new or worsening symptoms.   4. Follow-up with PCP as needed.      Scribe Attestation:   I, Cece Sims, am scribing for, and in the presence of, HEVER Julian. I performed the above scribed service and the documentation accurately describes the services I performed. I attest to the accuracy of the note.    I, HEVER Julian, personally performed the services described in this documentation. All medical record entries made by the scribe were at my direction and in my presence.  I have reviewed the chart and agree that the record reflects my personal performance and is accurate and complete. HEVER Julian.  3:21 PM 08/10/2020

## 2020-08-12 LAB — SARS-COV-2 RNA RESP QL NAA+PROBE: NOT DETECTED
